# Patient Record
Sex: FEMALE | NOT HISPANIC OR LATINO | Employment: UNEMPLOYED | URBAN - METROPOLITAN AREA
[De-identification: names, ages, dates, MRNs, and addresses within clinical notes are randomized per-mention and may not be internally consistent; named-entity substitution may affect disease eponyms.]

---

## 2019-09-24 ENCOUNTER — OFFICE VISIT (OUTPATIENT)
Dept: RETAIL CLINIC | Facility: CLINIC | Age: 29
End: 2019-09-24

## 2019-09-24 VITALS
OXYGEN SATURATION: 97 % | RESPIRATION RATE: 20 BRPM | DIASTOLIC BLOOD PRESSURE: 76 MMHG | TEMPERATURE: 98.1 F | HEART RATE: 93 BPM | SYSTOLIC BLOOD PRESSURE: 118 MMHG

## 2019-09-24 DIAGNOSIS — J40 BRONCHITIS: Primary | ICD-10-CM

## 2019-09-24 DIAGNOSIS — B35.4 TINEA CORPORIS: ICD-10-CM

## 2019-09-24 PROCEDURE — 99203 OFFICE O/P NEW LOW 30 MIN: CPT | Performed by: NURSE PRACTITIONER

## 2019-09-24 RX ORDER — PREDNISONE 20 MG/1
20 TABLET ORAL 2 TIMES DAILY
Qty: 10 TABLET | Refills: 0 | Status: SHIPPED | OUTPATIENT
Start: 2019-09-24 | End: 2019-09-29

## 2019-09-24 RX ORDER — DOXYCYCLINE HYCLATE 100 MG
100 TABLET ORAL 2 TIMES DAILY
Qty: 14 TABLET | Refills: 0 | Status: SHIPPED | OUTPATIENT
Start: 2019-09-24 | End: 2019-10-01

## 2019-09-24 RX ORDER — KETOCONAZOLE 20 MG/G
CREAM TOPICAL DAILY
Qty: 30 G | Refills: 0 | Status: SHIPPED | OUTPATIENT
Start: 2019-09-24 | End: 2019-10-08

## 2019-09-24 NOTE — PROGRESS NOTES
Mohan Moses is a 29 y.o. female.     Patient reports being seen at  about 1 month ago and diagnosed with URI. She was prescribed unknown medication but didn't start taking it because she started to feel better. However, about a week later her symptoms worsened significantly causing her to return to  where she was diagnosed with PNA. She was given azithromycin and prednisone and achieved complete symptom resolution after using the two meds. 6 days ago she started with cold symptoms. Her  is recovering from presumed viral illness with similar sx as the patient's. She comes into clinic today due to increasing SOA with exertion and wheezing x 2 days. She states this is how she started to feel prior to her  visit where it was found she had PNA.   Pt also mentions an occasionally itchy rash on her right elbow x 1 year. Recently she has been using clotrimazole without improvement.       URI    This is a new problem. Episode onset: 1 week ago. The problem has been gradually worsening. There has been no fever. Associated symptoms include congestion, coughing, headaches, rhinorrhea, a sore throat and wheezing. Pertinent negatives include no chest pain, diarrhea, ear pain, nausea, neck pain, plugged ear sensation, sneezing, swollen glands or vomiting. She has tried nothing for the symptoms.   Rash   This is a chronic problem. Episode onset: 1 year ago. The problem has been waxing and waning since onset. The affected locations include the right arm. The rash is characterized by dryness, itchiness and scaling. It is unknown if there was an exposure to a precipitant. Associated symptoms include congestion, coughing, fatigue, rhinorrhea, shortness of breath and a sore throat. Pertinent negatives include no diarrhea, fever, nail changes or vomiting. Past treatments include moisturizer and antihistamine (clotrimazole BID x 10 days ). The treatment provided no relief. There is no history of allergies or  eczema.       The following portions of the patient's history were reviewed and updated as appropriate: allergies, current medications, past medical history, past social history, past surgical history and problem list.    Review of Systems   Constitutional: Positive for fatigue. Negative for appetite change, chills, diaphoresis and fever.   HENT: Positive for congestion, postnasal drip, rhinorrhea and sore throat. Negative for ear discharge, ear pain, mouth sores, nosebleeds, sinus pressure, sneezing, trouble swallowing and voice change.    Eyes: Negative for redness and itching.   Respiratory: Positive for cough, chest tightness, shortness of breath and wheezing. Negative for stridor.    Cardiovascular: Negative for chest pain.   Gastrointestinal: Negative for diarrhea, nausea and vomiting.   Musculoskeletal: Negative for myalgias and neck pain.   Skin: Negative for nail changes.   Allergic/Immunologic: Negative for environmental allergies and immunocompromised state.   Neurological: Positive for headaches. Negative for dizziness.       Objective   Physical Exam   Constitutional: She is oriented to person, place, and time. She appears well-developed and well-nourished. She is cooperative. She does not appear ill. No distress.   HENT:   Right Ear: Tympanic membrane, external ear and ear canal normal.   Left Ear: Tympanic membrane, external ear and ear canal normal.   Nose: Mucosal edema present. No rhinorrhea. Right sinus exhibits no maxillary sinus tenderness and no frontal sinus tenderness. Left sinus exhibits no maxillary sinus tenderness and no frontal sinus tenderness.   Mouth/Throat: Uvula is midline and mucous membranes are normal. No oral lesions. No uvula swelling. Posterior oropharyngeal erythema present. No oropharyngeal exudate or posterior oropharyngeal edema. Tonsils are 2+ on the right. Tonsils are 2+ on the left. No tonsillar exudate.   Eyes: Conjunctivae and lids are normal.   Cardiovascular: Normal  rate, regular rhythm, S1 normal and S2 normal.   Pulmonary/Chest: Effort normal. She has no decreased breath sounds. She has wheezes in the right upper field, the right middle field and the left lower field. She has rhonchi (clears with deep cough) in the left upper field. She has no rales.   Lymphadenopathy:     She has no cervical adenopathy.   Neurological: She is alert and oriented to person, place, and time.   Skin: Skin is warm and dry. She is not diaphoretic. No pallor.        Vitals reviewed.      Assessment/Plan   Edith was seen today for uri.    Diagnoses and all orders for this visit:    Bronchitis  -     predniSONE (DELTASONE) 20 MG tablet; Take 1 tablet by mouth 2 (Two) Times a Day for 5 days.  -     doxycycline (VIBRAMYICN) 100 MG tablet; Take 1 tablet by mouth 2 (Two) Times a Day for 7 days.    Tinea corporis  -     ketoconazole (NIZORAL) 2 % cream; Apply  topically to the appropriate area as directed Daily for 14 days.          -     Declined nebulizer treatment in clinic today        -     Discussed with pt if medications are not affordable d/t lack of insurance to call clinic and alternative treatment options can be discussed        -     F/U with PCP for persistent symptoms or UC/ER for worsening symptoms

## 2019-09-24 NOTE — PATIENT INSTRUCTIONS
Body Ringworm  Body ringworm is an infection of the skin that often causes a ring-shaped rash. Body ringworm can affect any part of your skin. It can spread easily to others. Body ringworm is also called tinea corporis.  What are the causes?  This condition is caused by funguses called dermatophytes. The condition develops when these funguses grow out of control on the skin.  You can get this condition if you touch a person or animal that has it. You can also get it if you share clothing, bedding, towels, or any other object with an infected person or pet.  What increases the risk?  This condition is more likely to develop in:  · Athletes who often make skin-to-skin contact with other athletes, such as wrestlers.  · People who share equipment and mats.  · People with a weakened immune system.  What are the signs or symptoms?  Symptoms of this condition include:  · Itchy, raised red spots and bumps.  · Red scaly patches.  · A ring-shaped rash. The rash may have:  ? A clear center.  ? Scales or red bumps at its center.  ? Redness near its borders.  ? Dry and scaly skin on or around it.  How is this diagnosed?  This condition can usually be diagnosed with a skin exam. A skin scraping may be taken from the affected area and examined under a microscope to see if the fungus is present.  How is this treated?  This condition may be treated with:  · An antifungal cream or ointment.  · An antifungal shampoo.  · Antifungal medicines. These may be prescribed if your ringworm is severe, keeps coming back, or lasts a long time.  Follow these instructions at home:  · Take over-the-counter and prescription medicines only as told by your health care provider.  · If you were given an antifungal cream or ointment:  ? Use it as told by your health care provider.  ? Wash the infected area and dry it completely before applying the cream or ointment.  · If you were given an antifungal shampoo:  ? Use it as told by your health care  provider.  ? Leave the shampoo on your body for 3-5 minutes before rinsing.  · While you have a rash:  ? Wear loose clothing to stop clothes from rubbing and irritating it.  ? Wash or change your bed sheets every night.  · If your pet has the same infection, take your pet to see a .  How is this prevented?  · Practice good hygiene.  · Wear sandals or shoes in public places and showers.  · Do not share personal items with others.  · Avoid touching red patches of skin on other people.  · Avoid touching pets that have bald spots.  · If you touch an animal that has a bald spot, wash your hands.  Contact a health care provider if:  · Your rash continues to spread after 7 days of treatment.  · Your rash is not gone in 4 weeks.  · The area around your rash gets red, warm, tender, and swollen.  This information is not intended to replace advice given to you by your health care provider. Make sure you discuss any questions you have with your health care provider.  Document Released: 12/15/2001 Document Revised: 05/25/2017 Document Reviewed: 10/13/2016  [employer] Interactive Patient Education © 2019 [employer]      Acute Bronchitis, Adult    Acute bronchitis is sudden (acute) swelling of the air tubes (bronchi) in the lungs. Acute bronchitis causes these tubes to fill with mucus, which can make it hard to breathe. It can also cause coughing or wheezing.  In adults, acute bronchitis usually goes away within 2 weeks. A cough caused by bronchitis may last up to 3 weeks. Smoking, allergies, and asthma can make the condition worse. Repeated episodes of bronchitis may cause further lung problems, such as chronic obstructive pulmonary disease (COPD).  What are the causes?  This condition can be caused by germs and by substances that irritate the lungs, including:  · Cold and flu viruses. This condition is most often caused by the same virus that causes a cold.  · Bacteria.  · Exposure to tobacco smoke, dust, fumes, and  air pollution.  What increases the risk?  This condition is more likely to develop in people who:  · Have close contact with someone with acute bronchitis.  · Are exposed to lung irritants, such as tobacco smoke, dust, fumes, and vapors.  · Have a weak immune system.  · Have a respiratory condition such as asthma.  What are the signs or symptoms?  Symptoms of this condition include:  · A cough.  · Coughing up clear, yellow, or green mucus.  · Wheezing.  · Chest congestion.  · Shortness of breath.  · A fever.  · Body aches.  · Chills.  · A sore throat.  How is this diagnosed?  This condition is usually diagnosed with a physical exam. During the exam, your health care provider may order tests, such as chest X-rays, to rule out other conditions. He or she may also:  · Test a sample of your mucus for bacterial infection.  · Check the level of oxygen in your blood. This is done to check for pneumonia.  · Do a chest X-ray or lung function testing to rule out pneumonia and other conditions.  · Perform blood tests.  Your health care provider will also ask about your symptoms and medical history.  How is this treated?  Most cases of acute bronchitis clear up over time without treatment. Your health care provider may recommend:  · Drinking more fluids. Drinking more makes your mucus thinner, which may make it easier to breathe.  · Taking a medicine for a fever or cough.  · Taking an antibiotic medicine.  · Using an inhaler to help improve shortness of breath and to control a cough.  · Using a cool mist vaporizer or humidifier to make it easier to breathe.  Follow these instructions at home:  Medicines  · Take over-the-counter and prescription medicines only as told by your health care provider.  · If you were prescribed an antibiotic, take it as told by your health care provider. Do not stop taking the antibiotic even if you start to feel better.  General instructions    · Get plenty of rest.  · Drink enough fluids to keep  your urine pale yellow.  · Avoid smoking and secondhand smoke. Exposure to cigarette smoke or irritating chemicals will make bronchitis worse. If you smoke and you need help quitting, ask your health care provider. Quitting smoking will help your lungs heal faster.  · Use an inhaler, cool mist vaporizer, or humidifier as told by your health care provider.  · Keep all follow-up visits as told by your health care provider. This is important.  How is this prevented?  To lower your risk of getting this condition again:  · Wash your hands often with soap and water. If soap and water are not available, use hand .  · Avoid contact with people who have cold symptoms.  · Try not to touch your hands to your mouth, nose, or eyes.  · Make sure to get the flu shot every year.  Contact a health care provider if:  · Your symptoms do not improve in 2 weeks of treatment.  Get help right away if:  · You cough up blood.  · You have chest pain.  · You have severe shortness of breath.  · You become dehydrated.  · You faint or keep feeling like you are going to faint.  · You keep vomiting.  · You have a severe headache.  · Your fever or chills gets worse.  This information is not intended to replace advice given to you by your health care provider. Make sure you discuss any questions you have with your health care provider.  Document Released: 01/25/2006 Document Revised: 08/01/2018 Document Reviewed: 06/07/2017  [employer] Interactive Patient Education © 2019 [employer]

## 2020-06-02 ENCOUNTER — OFFICE VISIT (OUTPATIENT)
Dept: OBSTETRICS AND GYNECOLOGY | Facility: CLINIC | Age: 30
End: 2020-06-02

## 2020-06-02 ENCOUNTER — HOSPITAL ENCOUNTER (OUTPATIENT)
Facility: HOSPITAL | Age: 30
Setting detail: HOSPITAL OUTPATIENT SURGERY
End: 2020-06-02
Attending: OBSTETRICS & GYNECOLOGY | Admitting: OBSTETRICS & GYNECOLOGY

## 2020-06-02 VITALS
DIASTOLIC BLOOD PRESSURE: 60 MMHG | BODY MASS INDEX: 27.84 KG/M2 | WEIGHT: 141.8 LBS | SYSTOLIC BLOOD PRESSURE: 110 MMHG | HEIGHT: 60 IN

## 2020-06-02 DIAGNOSIS — Z30.2 REQUEST FOR STERILIZATION: ICD-10-CM

## 2020-06-02 DIAGNOSIS — Z13.9 SCREENING FOR CONDITION: Primary | ICD-10-CM

## 2020-06-02 LAB
B-HCG UR QL: NEGATIVE
BILIRUB BLD-MCNC: NEGATIVE MG/DL
CLARITY, POC: CLEAR
COLOR UR: YELLOW
GLUCOSE UR STRIP-MCNC: NEGATIVE MG/DL
INTERNAL NEGATIVE CONTROL: NEGATIVE
INTERNAL POSITIVE CONTROL: POSITIVE
KETONES UR QL: ABNORMAL
LEUKOCYTE EST, POC: NEGATIVE
Lab: 55
NITRITE UR-MCNC: NEGATIVE MG/ML
PH UR: 6 [PH] (ref 5–8)
PROT UR STRIP-MCNC: ABNORMAL MG/DL
RBC # UR STRIP: NEGATIVE /UL
SP GR UR: 1.01 (ref 1–1.03)
UROBILINOGEN UR QL: NORMAL

## 2020-06-02 PROCEDURE — 81025 URINE PREGNANCY TEST: CPT | Performed by: OBSTETRICS & GYNECOLOGY

## 2020-06-02 PROCEDURE — 99213 OFFICE O/P EST LOW 20 MIN: CPT | Performed by: OBSTETRICS & GYNECOLOGY

## 2020-06-02 NOTE — PROGRESS NOTES
"EVALUATION AND MANAGEMENT    Patient Care Team:  Provider, No Known as PCP - General  Provider, No Known    Patient new to practice? No  Patient new to examiner? No  Patient referred? No  -----------------------------------------------------HISTORY---------------------------------------------------    Chief Complaint:   Chief Complaint   Patient presents with   • Pre-op Exam     discuss tubal     New problem to examiner? Yes    29 y.o.  No LMP recorded. (Menstrual status: Oral contraceptives).    HPI: History of Present Illness      Pt desires permanent sterilization.  R/b/a/fr explained to pt who verbalized her understanding.      PFSH:   1.    Past Medical History:   Diagnosis Date   • Pneumonia 2019    outpt tx with azithromycin and prednisone      2. History reviewed. No pertinent family history.  3. Social History: :    Employment/occupation:  Yes   Smoker: No   Alcohol: No  Recreational drugs: No    PAST HISTORY REVIEWED:  1.   Past Surgical History:   Procedure Laterality Date   • OVARIAN CYST REMOVAL        2.   Current Outpatient Medications:   •  NON FORMULARY, Unknown OCP, Disp: , Rfl:   3. No Known Allergies    ROS:  Review of Systems   Constitutional: Negative.    HENT: Negative.    Eyes: Negative.    Respiratory: Negative.    Cardiovascular: Negative.    Gastrointestinal: Negative.    Endocrine: Negative.    Musculoskeletal: Negative.    Skin: Negative.    Allergic/Immunologic: Negative.    Neurological: Negative.    Hematological: Negative.    Psychiatric/Behavioral: Negative.        -----------------------------------------------PHYSICAL EXAM----------------------------------------------    Vital Signs: /60   Ht 152.4 cm (60\")   Wt 64.3 kg (141 lb 12.8 oz)   Breastfeeding No   BMI 27.69 kg/m²    Flowsheet Rows      First Filed Value   Admission Height  152.4 cm (60\") Documented at 2020 1403   Admission Weight  64.3 kg (141 lb 12.8 oz) Documented at 2020 " 1403          Physical Exam   Constitutional: She is oriented to person, place, and time. She appears well-developed and well-nourished.   HENT:   Head: Normocephalic and atraumatic.   Eyes: EOM are normal.   Neck: Normal range of motion.   Cardiovascular: Normal rate.   Pulmonary/Chest: Effort normal.   Abdominal: Soft. She exhibits no distension and no mass. There is no tenderness. There is no guarding.   Genitourinary: No vaginal discharge found.   Musculoskeletal: Normal range of motion. She exhibits no edema, tenderness or deformity.   Neurological: She is alert and oriented to person, place, and time.   Skin: Skin is warm and dry. No rash noted. No erythema. No pallor.   Psychiatric: She has a normal mood and affect. Her behavior is normal. Judgment and thought content normal.   Nursing note and vitals reviewed.      -----------------------------------------------MEDICAL DECISION MAKING-----------------------------      DATA Review & labs ordered:     1.   Lab Results (last 24 hours)     Procedure Component Value Units Date/Time    POC Urinalysis Dipstick [[id number]]  (Abnormal) Collected:  06/02/20 1412    Specimen:  Urine Updated:  06/02/20 1413     Color Yellow     Clarity, UA Clear     Glucose, UA Negative mg/dL      Bilirubin Negative     Ketones, UA Trace     Specific Gravity  1.015     Blood, UA Negative     pH, Urine 6.0     Protein, POC Trace mg/dL      Urobilinogen, UA Normal     Leukocytes Negative     Nitrite, UA Negative    POC Pregnancy, Urine [[id number]]  (Normal) Collected:  06/02/20 1411    Specimen:  Urine Updated:  06/02/20 1412     HCG, Urine, QL Negative     Lot Number [id number]     Internal Positive Control Positive     Internal Negative Control Negative        2.   Imaging Results (Last 24 Hours)     ** No results found for the last 24 hours. **        3.   ECG/EMG Results (most recent)     None        4. Old records reviewed? No  5. Old records ordered?  No  6. Labs ordered?: Yes:   urinalysis  7. Imaging other than ultrasound ordered?: No  8. Diagnoses and/or chronic conditions reviewed with pt:       Edith was seen today for pre-op exam.    Diagnoses and all orders for this visit:    Screening for condition  -     POC Urinalysis Dipstick  -     POC Pregnancy, Urine    Request for sterilization  -     Case Request; Standing  -     CBC and Differential; Future  -     Case Request    Other orders  -     Follow Anesthesia Guidelines / Standing Orders; Future  -     Chlorhexidine Skin Prep; Future      9. Risk counseling done:  yes  10. Ultrasound ordered and reviewed?   No      IMPRESSION & DIAGNOSIS:      Patient Active Problem List   Diagnosis   • Request for sterilization       R/b/a/fr explained to pt who verbalized her understanding.     Established problem/s? No   Worsening? No  New Problem/s? Yes   Additional workup planned? No      PLAN:     LAPAROSCOPIC BILATERAL TUBAL LIGATION WITH CAUTERY FOR PERMANENT STERILIZATION.     RTO Return if symptoms worsen or fail to improve.       TIME: More than 50% of time spent in counseling and/or coordination of care.Time spent in counseling 20 min.  Counseling included the following topics permanent sterilization by tubal versus vasectomy with prognosis, differential diagnosis, risks, benefits of treatment, instructions, compliance and/or risk reduction and alternatives.       Benjy Houston MD  14:30  06/02/20

## 2020-06-07 ENCOUNTER — RESULTS ENCOUNTER (OUTPATIENT)
Dept: OBSTETRICS AND GYNECOLOGY | Facility: CLINIC | Age: 30
End: 2020-06-07

## 2020-06-07 DIAGNOSIS — Z30.2 REQUEST FOR STERILIZATION: ICD-10-CM

## 2020-07-01 ENCOUNTER — TRANSCRIBE ORDERS (OUTPATIENT)
Dept: ADMINISTRATIVE | Facility: HOSPITAL | Age: 30
End: 2020-07-01

## 2020-07-01 DIAGNOSIS — Z01.818 OTHER SPECIFIED PRE-OPERATIVE EXAMINATION: Primary | ICD-10-CM

## 2020-07-06 ENCOUNTER — ANESTHESIA EVENT (OUTPATIENT)
Dept: PERIOP | Facility: HOSPITAL | Age: 30
End: 2020-07-06

## 2020-07-07 ENCOUNTER — LAB (OUTPATIENT)
Dept: LAB | Facility: HOSPITAL | Age: 30
End: 2020-07-07

## 2020-07-07 DIAGNOSIS — Z01.818 OTHER SPECIFIED PRE-OPERATIVE EXAMINATION: ICD-10-CM

## 2020-07-09 ENCOUNTER — ANESTHESIA (OUTPATIENT)
Dept: PERIOP | Facility: HOSPITAL | Age: 30
End: 2020-07-09

## 2021-06-09 ENCOUNTER — OFFICE VISIT (OUTPATIENT)
Dept: OBSTETRICS AND GYNECOLOGY | Facility: CLINIC | Age: 31
End: 2021-06-09

## 2021-06-09 VITALS
SYSTOLIC BLOOD PRESSURE: 122 MMHG | DIASTOLIC BLOOD PRESSURE: 60 MMHG | BODY MASS INDEX: 28.47 KG/M2 | WEIGHT: 145 LBS | HEIGHT: 60 IN

## 2021-06-09 DIAGNOSIS — Z30.2 REQUEST FOR STERILIZATION: ICD-10-CM

## 2021-06-09 DIAGNOSIS — Z13.89 SCREENING FOR GENITOURINARY CONDITION: Primary | ICD-10-CM

## 2021-06-09 LAB
B-HCG UR QL: NEGATIVE
INTERNAL NEGATIVE CONTROL: NORMAL
INTERNAL POSITIVE CONTROL: NORMAL
Lab: NORMAL

## 2021-06-09 PROCEDURE — 99214 OFFICE O/P EST MOD 30 MIN: CPT | Performed by: OBSTETRICS & GYNECOLOGY

## 2021-06-09 PROCEDURE — 81025 URINE PREGNANCY TEST: CPT | Performed by: OBSTETRICS & GYNECOLOGY

## 2021-06-09 NOTE — PROGRESS NOTES
"EVALUATION AND MANAGEMENT ENCOUNTER    Edith Moses  Patient new to examiner? No  New problem to examiner? Yes  Patient referred? No    -----------------------------------------------------HISTORY---------------------------------------------------    Chief Complaint:   Chief Complaint   Patient presents with   • Pre-op Exam     Discuss BTL       HPI:  Edith Moses is a 30 y.o.  with Patient's last menstrual period was 2021. here to discuss and schedule permanent sterilization.    Pt denies:  Any problems.    History of Present Illness     Edith Moses  reports that she has never smoked. She has never used smokeless tobacco.. t.           ROS:  Review of Systems   Constitutional: Negative.    HENT: Negative.    Eyes: Negative.    Respiratory: Negative.    Cardiovascular: Negative.    Gastrointestinal: Negative.    Endocrine: Negative.    Musculoskeletal: Negative.    Skin: Negative.    Allergic/Immunologic: Negative.    Neurological: Negative.    Hematological: Negative.    Psychiatric/Behavioral: Negative.    :    Patient reports that she is not currently experiencing any symptoms of urinary incontinence.      noTESTED FOR CHLAMYDIA?  -----------------------------------------------PHYSICAL EXAM----------------------------------------------    Vital Signs: /60   Ht 152.4 cm (60\")   Wt 65.8 kg (145 lb)   LMP 2021   Breastfeeding No   BMI 28.32 kg/m²    Flowsheet Rows      First Filed Value   Admission Height  152.4 cm (60\") Documented at 2021 1003   Admission Weight  65.8 kg (145 lb) Documented at 2021 1003          Physical Exam  Vitals and nursing note reviewed.   Constitutional:       Appearance: She is well-developed.   HENT:      Head: Normocephalic and atraumatic.   Cardiovascular:      Rate and Rhythm: Normal rate.   Pulmonary:      Effort: Pulmonary effort is normal.   Abdominal:      General: There is no distension.      Palpations: Abdomen is soft. There is no " mass.      Tenderness: There is no abdominal tenderness. There is no guarding.   Genitourinary:     Vagina: No vaginal discharge.   Musculoskeletal:         General: No tenderness or deformity. Normal range of motion.      Cervical back: Normal range of motion.   Skin:     General: Skin is warm and dry.      Coloration: Skin is not pale.      Findings: No erythema or rash.   Neurological:      Mental Status: She is alert and oriented to person, place, and time.   Psychiatric:         Behavior: Behavior normal.         Thought Content: Thought content normal.         Judgment: Judgment normal.         -----------------------------------------------MEDICAL DECISION MAKING-----------------------------        DATA Review & labs ordered:     1.   Lab Results (last 24 hours)     Procedure Component Value Units Date/Time    POC Pregnancy, Urine [[id number]] Collected: 06/09/21 1004    Specimen: Urine Updated: 06/09/21 1004     HCG, Urine, QL Negative     Lot Number [id number]     Internal Positive Control Passed     Internal Negative Control Passed        2.   Imaging Results (Last 24 Hours)     ** No results found for the last 24 hours. **        3.   ECG/EMG Results (most recent)     None        4. Old records reviewed? Yes  5. Old records ordered?  No  6. Labs ordered?: Yes:  urinalysis: clr  7. Imaging other than ultrasound ordered?: No        Reviewed with other physician? no     8. Ultrasound ordered and reviewed? No  9. Diagnoses and/or chronic conditions reviewed:      Diagnoses and all orders for this visit:    1. Screening for genitourinary condition (Primary)  -     POC Pregnancy, Urine    2. Request for sterilization  -     Case Request; Standing  -     CBC and Differential; Future  -     Case Request    Other orders  -     Follow Anesthesia Guidelines / Standing Orders; Future  -     Chlorhexidine Skin Prep; Future  -     Follow Anesthesia Guidelines / Standing Orders; Standing  -     Obtain informed consent;  Standing  -     Place sequential compression device; Standing  -     Verify / Perform Chlorhexidine Skin Prep; Standing        IMPRESSION/PROBLEM:      Indicated sterilization    (Established problem/s? No, worsening? No)    (New Problem/s? Yes, additional workup planned? No)      PLAN:     1. LAPAROSCOPIC BILATERAL SALPINGECTOMY FOR PERMANENT STERILIZATION.    2. RISKS, ALTERNATIVES, COMPLICATIONS OF THE PROCEDURE INCLUDING BUT NOT LIMITED TO:    INTRAOPERATIVE RISKS: INJURY TO INTERNAL AND ADJACENT ORGANS AND STRUCTURES (BOWEL, BLADDER, URETER,BLOOD VESSELS) OR HEMORRHAGE REQUIRING FURTHER SURGERY (LAPAROTOMY),  POSSIBLE NON-DIAGNOSTIC FINDINGS, DISCOVERY OF POSSIBLE MALIGNANCY, INFECTION, AND DEATH;   POSTOP COMPLICATIONS: BLEEDING, INFECTION (REQUIRING POSSIBLE REOPERATION), FAILURE OF GOAL OF SURGERY AND RECURRENCE OF ORIGINAL SYMPTOMS, PNEUMONIA, PULMONARY EMBOLISM, AND DEATH;  WERE EXPLAINED TO THE PT WHO VERBALIZED HER UNDERSTANDING.            Pt to call for any results from testing promptly if she does not hear from us.     RTO Return if symptoms worsen or fail to improve, for postop check..  Instructions and precautions given.     I spent 30 minutes caring for Edith on this date of service. This time includes time spent by me in the following activities: preparing for the visit, reviewing tests, obtaining and/or reviewing a separately obtained history, counseling and educating the patient/family/caregiver, ordering medications, tests, or procedures, documenting information in the medical record, independently interpreting results and communicating that information with the patient/family/caregiver and care coordination.       Benjy Houston MD  10:36 EDT  06/09/21

## 2021-08-05 ENCOUNTER — TRANSCRIBE ORDERS (OUTPATIENT)
Dept: ADMINISTRATIVE | Facility: HOSPITAL | Age: 31
End: 2021-08-05

## 2021-08-05 DIAGNOSIS — U07.1 COVID-19: Primary | ICD-10-CM

## 2021-08-11 ENCOUNTER — PRE-ADMISSION TESTING (OUTPATIENT)
Dept: PREADMISSION TESTING | Facility: HOSPITAL | Age: 31
End: 2021-08-11

## 2021-08-11 VITALS
HEIGHT: 60 IN | WEIGHT: 143.1 LBS | OXYGEN SATURATION: 99 % | RESPIRATION RATE: 16 BRPM | BODY MASS INDEX: 28.09 KG/M2 | SYSTOLIC BLOOD PRESSURE: 119 MMHG | DIASTOLIC BLOOD PRESSURE: 77 MMHG | HEART RATE: 76 BPM

## 2021-08-11 PROCEDURE — 85025 COMPLETE CBC W/AUTO DIFF WBC: CPT | Performed by: OBSTETRICS & GYNECOLOGY

## 2021-08-11 RX ORDER — MULTIPLE VITAMINS W/ MINERALS TAB 9MG-400MCG
1 TAB ORAL DAILY
COMMUNITY

## 2021-08-11 RX ORDER — OMEPRAZOLE 20 MG/1
20 CAPSULE, DELAYED RELEASE ORAL DAILY PRN
COMMUNITY

## 2021-08-11 RX ORDER — NORETHINDRONE ACETATE AND ETHINYL ESTRADIOL 1; 5 MG/1; UG/1
1 TABLET ORAL DAILY
COMMUNITY
End: 2021-08-19 | Stop reason: HOSPADM

## 2021-08-11 RX ORDER — ASCORBIC ACID 500 MG
500 TABLET ORAL DAILY
COMMUNITY

## 2021-08-11 NOTE — DISCHARGE INSTRUCTIONS
TO STOP CLEARS/GATORADE @ 4:30 AM THE DAY OF SURGERY    TO HOLD VITAMIN C, HAIR, SKIN & NAIL VITAMIN FOR 1 WEEK PRIOR TO SURGERY    PRE-ADMISSION TESTING INSTRUCTIONS FOR ADULTS    Take these medications the morning of surgery with a small sip of water: OMEPRAZOLE      No aspirin, advil, aleve, ibuprofen, naproxen, diet pills, decongestants, or herbal/vitamins for a week prior to surgery.    General Instructions:    • Do not eat solid food after midnight the night before surgery.  No gum, mints, or hard candy after midnight the night before surgery.  • You may drink clear liquids the day of surgery up until 2 hours before your arrival time.  • Clear liquids are liquids you can see through. Nothing RED in color.    Plain water    Sports drinks  Sodas     Gelatin (Jell-O)  Fruit juices without pulp such as white grape juice and apple juice  Popsicles that contain no fruit or yogurt  Tea or coffee (no cream or milk added)    • It is beneficial for you to have a clear drink that contains carbohydrates just before you leave your house and before your fasting time begins.  We suggest a 20 ounce bottle of Gatorade or Powerade for non-diabetic patients or a 20 ounce bottle of G2 or Powerade Zero for diabetic patients.     • Patients who avoid smoking, chewing tobacco and alcohol for 4 weeks prior to surgery have a reduced risk of post-operative complications.  If at all possible, quit smoking as many days before surgery as you can.    • Do not smoke, use chewing tobacco or drink alcohol the day of surgery    • Bring your C-PAP/ BI-PAP machine if you use one.  • Wear clean comfortable clothes and socks.  • Do not wear contact lenses, lotion, deodorant, or make-up.  Bring a case for your glasses if applicable. You may brush your teeth the morning of surgery.  • You may wear dentures/partials, do not put adhesive/glue on them.  • Bring crutches or walker if applicable.  • Leave all other jewelry and valuables at  home.      Preventing a Surgical Site Infection:    • Shower the night before and on the morning of surgery using the chlorhexidine soap you were given.  Use a clean washcloth with the soap.  Place clean sheets on your bed after showering the night before surgery. Do not use the CHG soap on your hair, face, or private areas. Wash your body gently for five (5) minutes. Do not scrub your skin.  Dry with a clean towel and dress in clean clothing.    • Do not shave the surgical area for 10 days-2 weeks prior to surgery  because the razor can irritate skin and make it easier to develop an infection.  • Make sure you, your family, and all healthcare providers clean their hands with soap and water or an alcohol based hand  before caring for you or your wound.      Day of surgery:    Your surgeon’s office will advise you of your arrival time for the day of surgery.    Upon arrival, a Pre-op nurse and Anesthesia provider will review your health history, obtain vital signs, and answer questions you may have.  The only belongings needed at this time will be your home medications and if applicable your C-PAP/BI-PAP machine.  If you are staying overnight your family can leave the rest of your belongings in the car and bring them to your room later.  A Pre-op nurse will start an IV and you may receive medication in preparation for surgery, including something to help you relax.  Your family will be able to see you in the Pre-op area.  While you are in surgery your family should notify the waiting room  if they leave the waiting room area and provide a contact phone number.    IF you have any questions, you can call the Pre-Admission Department at [phone] or your surgeon's office.  Notify your surgeon if  you become sick, have a fever, productive cough, or cannot be here the day of surgery    Please be aware that surgery does come with discomfort.  We want to make every effort to control your discomfort  so please discuss any uncontrolled symptoms with your nurse.   Your doctor will most likely have prescribed pain medications.      If you are going home after surgery, you will receive individualized written care instructions before being discharged.  A responsible adult (over the age of 18) must drive you to and from the hospital on the day of your surgery and stay with you for 24 hours after anesthesia.    If you are staying overnight following surgery, you will be transported to your hospital room following the recovery period.  [facility] has all private rooms.    You may receive a survey regarding the care you received. Your feedback is very important and will be used to collect the necessary data to help us to continue to provide excellent care.     Deductibles and co-payments are collected on the day of service. Please be prepared to pay the required co-pay, deductible or deposit on the day of service as defined by your plan.

## 2021-08-11 NOTE — PAT
Patient here for PAT visit, lab completed, pre-op instructions reviewed, verbalizes understanding, scheduled for covid test on 08/17.

## 2021-08-17 ENCOUNTER — LAB (OUTPATIENT)
Dept: LAB | Facility: HOSPITAL | Age: 31
End: 2021-08-17

## 2021-08-17 DIAGNOSIS — U07.1 COVID-19: ICD-10-CM

## 2021-08-17 LAB — SARS-COV-2 RNA PNL SPEC NAA+PROBE: NOT DETECTED

## 2021-08-17 PROCEDURE — C9803 HOPD COVID-19 SPEC COLLECT: HCPCS

## 2021-08-17 PROCEDURE — 87635 SARS-COV-2 COVID-19 AMP PRB: CPT | Performed by: OBSTETRICS & GYNECOLOGY

## 2021-08-18 ENCOUNTER — ANESTHESIA EVENT (OUTPATIENT)
Dept: PERIOP | Facility: HOSPITAL | Age: 31
End: 2021-08-18

## 2021-08-19 ENCOUNTER — ANESTHESIA (OUTPATIENT)
Dept: PERIOP | Facility: HOSPITAL | Age: 31
End: 2021-08-19

## 2021-08-19 ENCOUNTER — HOSPITAL ENCOUNTER (OUTPATIENT)
Facility: HOSPITAL | Age: 31
Setting detail: HOSPITAL OUTPATIENT SURGERY
Discharge: HOME | End: 2021-08-19
Attending: OBSTETRICS & GYNECOLOGY | Admitting: OBSTETRICS & GYNECOLOGY

## 2021-08-19 VITALS
BODY MASS INDEX: 27.81 KG/M2 | SYSTOLIC BLOOD PRESSURE: 105 MMHG | HEART RATE: 74 BPM | TEMPERATURE: 98.4 F | OXYGEN SATURATION: 97 % | DIASTOLIC BLOOD PRESSURE: 67 MMHG | RESPIRATION RATE: 13 BRPM | WEIGHT: 142.4 LBS

## 2021-08-19 DIAGNOSIS — Z90.79 STATUS POST BILATERAL SALPINGECTOMY: Primary | ICD-10-CM

## 2021-08-19 DIAGNOSIS — Z30.2 REQUEST FOR STERILIZATION: ICD-10-CM

## 2021-08-19 LAB — HCG SERPL QL: NEGATIVE

## 2021-08-19 PROCEDURE — 25010000002 NEOSTIGMINE 10 MG/10ML SOLUTION: Performed by: NURSE ANESTHETIST, CERTIFIED REGISTERED

## 2021-08-19 PROCEDURE — 84703 CHORIONIC GONADOTROPIN ASSAY: CPT | Performed by: NURSE ANESTHETIST, CERTIFIED REGISTERED

## 2021-08-19 PROCEDURE — 25010000002 HYDROMORPHONE PER 4 MG: Performed by: NURSE ANESTHETIST, CERTIFIED REGISTERED

## 2021-08-19 PROCEDURE — 25010000002 ONDANSETRON PER 1 MG: Performed by: NURSE ANESTHETIST, CERTIFIED REGISTERED

## 2021-08-19 PROCEDURE — 58661 LAPAROSCOPY REMOVE ADNEXA: CPT | Performed by: SPECIALIST/TECHNOLOGIST, OTHER

## 2021-08-19 PROCEDURE — 25010000002 MIDAZOLAM PER 1MG: Performed by: NURSE ANESTHETIST, CERTIFIED REGISTERED

## 2021-08-19 PROCEDURE — 58661 LAPAROSCOPY REMOVE ADNEXA: CPT | Performed by: OBSTETRICS & GYNECOLOGY

## 2021-08-19 PROCEDURE — S0260 H&P FOR SURGERY: HCPCS | Performed by: OBSTETRICS & GYNECOLOGY

## 2021-08-19 PROCEDURE — 25010000002 PROPOFOL 10 MG/ML EMULSION: Performed by: NURSE ANESTHETIST, CERTIFIED REGISTERED

## 2021-08-19 PROCEDURE — 25010000002 FENTANYL CITRATE (PF) 50 MCG/ML SOLUTION: Performed by: NURSE ANESTHETIST, CERTIFIED REGISTERED

## 2021-08-19 PROCEDURE — 25010000002 SUCCINYLCHOLINE PER 20 MG: Performed by: NURSE ANESTHETIST, CERTIFIED REGISTERED

## 2021-08-19 PROCEDURE — 25010000002 DEXAMETHASONE PER 1 MG: Performed by: NURSE ANESTHETIST, CERTIFIED REGISTERED

## 2021-08-19 PROCEDURE — 88302 TISSUE EXAM BY PATHOLOGIST: CPT | Performed by: OBSTETRICS & GYNECOLOGY

## 2021-08-19 RX ORDER — MAGNESIUM HYDROXIDE 1200 MG/15ML
LIQUID ORAL AS NEEDED
Status: DISCONTINUED | OUTPATIENT
Start: 2021-08-19 | End: 2021-08-19 | Stop reason: HOSPADM

## 2021-08-19 RX ORDER — FENTANYL CITRATE 50 UG/ML
50 INJECTION, SOLUTION INTRAMUSCULAR; INTRAVENOUS
Status: DISCONTINUED | OUTPATIENT
Start: 2021-08-19 | End: 2021-08-19 | Stop reason: HOSPADM

## 2021-08-19 RX ORDER — FENTANYL CITRATE 50 UG/ML
INJECTION, SOLUTION INTRAMUSCULAR; INTRAVENOUS AS NEEDED
Status: DISCONTINUED | OUTPATIENT
Start: 2021-08-19 | End: 2021-08-19 | Stop reason: SURG

## 2021-08-19 RX ORDER — LIDOCAINE HYDROCHLORIDE 10 MG/ML
0.5 INJECTION, SOLUTION EPIDURAL; INFILTRATION; INTRACAUDAL; PERINEURAL ONCE AS NEEDED
Status: DISCONTINUED | OUTPATIENT
Start: 2021-08-19 | End: 2021-08-19 | Stop reason: HOSPADM

## 2021-08-19 RX ORDER — HYDROCODONE BITARTRATE AND ACETAMINOPHEN 5; 325 MG/1; MG/1
1 TABLET ORAL ONCE AS NEEDED
Status: DISCONTINUED | OUTPATIENT
Start: 2021-08-19 | End: 2021-08-19 | Stop reason: HOSPADM

## 2021-08-19 RX ORDER — IBUPROFEN 800 MG/1
800 TABLET ORAL EVERY 8 HOURS PRN
Qty: 30 TABLET | Refills: 0 | Status: SHIPPED | OUTPATIENT
Start: 2021-08-19

## 2021-08-19 RX ORDER — SODIUM CHLORIDE, SODIUM LACTATE, POTASSIUM CHLORIDE, CALCIUM CHLORIDE 600; 310; 30; 20 MG/100ML; MG/100ML; MG/100ML; MG/100ML
9 INJECTION, SOLUTION INTRAVENOUS CONTINUOUS
Status: DISCONTINUED | OUTPATIENT
Start: 2021-08-19 | End: 2021-08-19 | Stop reason: HOSPADM

## 2021-08-19 RX ORDER — MIDAZOLAM HYDROCHLORIDE 2 MG/2ML
1 INJECTION, SOLUTION INTRAMUSCULAR; INTRAVENOUS
Status: DISCONTINUED | OUTPATIENT
Start: 2021-08-19 | End: 2021-08-19 | Stop reason: HOSPADM

## 2021-08-19 RX ORDER — PROPOFOL 10 MG/ML
VIAL (ML) INTRAVENOUS AS NEEDED
Status: DISCONTINUED | OUTPATIENT
Start: 2021-08-19 | End: 2021-08-19 | Stop reason: SURG

## 2021-08-19 RX ORDER — HYDROCODONE BITARTRATE AND ACETAMINOPHEN 7.5; 325 MG/1; MG/1
1 TABLET ORAL EVERY 4 HOURS PRN
Status: DISCONTINUED | OUTPATIENT
Start: 2021-08-19 | End: 2021-08-19 | Stop reason: HOSPADM

## 2021-08-19 RX ORDER — NEOSTIGMINE METHYLSULFATE 1 MG/ML
INJECTION, SOLUTION INTRAVENOUS AS NEEDED
Status: DISCONTINUED | OUTPATIENT
Start: 2021-08-19 | End: 2021-08-19 | Stop reason: SURG

## 2021-08-19 RX ORDER — SODIUM CHLORIDE 9 MG/ML
40 INJECTION, SOLUTION INTRAVENOUS AS NEEDED
Status: DISCONTINUED | OUTPATIENT
Start: 2021-08-19 | End: 2021-08-19 | Stop reason: HOSPADM

## 2021-08-19 RX ORDER — SUCCINYLCHOLINE CHLORIDE 20 MG/ML
INJECTION INTRAMUSCULAR; INTRAVENOUS AS NEEDED
Status: DISCONTINUED | OUTPATIENT
Start: 2021-08-19 | End: 2021-08-19 | Stop reason: SURG

## 2021-08-19 RX ORDER — SODIUM CHLORIDE 0.9 % (FLUSH) 0.9 %
10 SYRINGE (ML) INJECTION EVERY 12 HOURS SCHEDULED
Status: DISCONTINUED | OUTPATIENT
Start: 2021-08-19 | End: 2021-08-19 | Stop reason: HOSPADM

## 2021-08-19 RX ORDER — SODIUM CHLORIDE, SODIUM LACTATE, POTASSIUM CHLORIDE, CALCIUM CHLORIDE 600; 310; 30; 20 MG/100ML; MG/100ML; MG/100ML; MG/100ML
100 INJECTION, SOLUTION INTRAVENOUS CONTINUOUS
Status: DISCONTINUED | OUTPATIENT
Start: 2021-08-19 | End: 2021-08-19 | Stop reason: HOSPADM

## 2021-08-19 RX ORDER — DEXAMETHASONE SODIUM PHOSPHATE 4 MG/ML
4 INJECTION, SOLUTION INTRA-ARTICULAR; INTRALESIONAL; INTRAMUSCULAR; INTRAVENOUS; SOFT TISSUE ONCE AS NEEDED
Status: COMPLETED | OUTPATIENT
Start: 2021-08-19 | End: 2021-08-19

## 2021-08-19 RX ORDER — ONDANSETRON 2 MG/ML
4 INJECTION INTRAMUSCULAR; INTRAVENOUS ONCE AS NEEDED
Status: DISCONTINUED | OUTPATIENT
Start: 2021-08-19 | End: 2021-08-19 | Stop reason: HOSPADM

## 2021-08-19 RX ORDER — EPHEDRINE SULFATE 50 MG/ML
INJECTION, SOLUTION INTRAVENOUS AS NEEDED
Status: DISCONTINUED | OUTPATIENT
Start: 2021-08-19 | End: 2021-08-19 | Stop reason: SURG

## 2021-08-19 RX ORDER — LIDOCAINE HYDROCHLORIDE 20 MG/ML
INJECTION, SOLUTION INFILTRATION; PERINEURAL AS NEEDED
Status: DISCONTINUED | OUTPATIENT
Start: 2021-08-19 | End: 2021-08-19 | Stop reason: SURG

## 2021-08-19 RX ORDER — KETAMINE HYDROCHLORIDE 10 MG/ML
INJECTION INTRAMUSCULAR; INTRAVENOUS AS NEEDED
Status: DISCONTINUED | OUTPATIENT
Start: 2021-08-19 | End: 2021-08-19 | Stop reason: SURG

## 2021-08-19 RX ORDER — HYDROCODONE BITARTRATE AND ACETAMINOPHEN 5; 325 MG/1; MG/1
2 TABLET ORAL EVERY 6 HOURS PRN
Qty: 20 TABLET | Refills: 0 | Status: SHIPPED | OUTPATIENT
Start: 2021-08-19

## 2021-08-19 RX ORDER — HYDROMORPHONE HYDROCHLORIDE 1 MG/ML
0.25 INJECTION, SOLUTION INTRAMUSCULAR; INTRAVENOUS; SUBCUTANEOUS
Status: DISCONTINUED | OUTPATIENT
Start: 2021-08-19 | End: 2021-08-19 | Stop reason: HOSPADM

## 2021-08-19 RX ORDER — SCOLOPAMINE TRANSDERMAL SYSTEM 1 MG/1
1 PATCH, EXTENDED RELEASE TRANSDERMAL CONTINUOUS
Status: DISCONTINUED | OUTPATIENT
Start: 2021-08-19 | End: 2021-08-19 | Stop reason: HOSPADM

## 2021-08-19 RX ORDER — GLYCOPYRROLATE 0.2 MG/ML
INJECTION INTRAMUSCULAR; INTRAVENOUS AS NEEDED
Status: DISCONTINUED | OUTPATIENT
Start: 2021-08-19 | End: 2021-08-19 | Stop reason: SURG

## 2021-08-19 RX ORDER — HYDROMORPHONE HCL 110MG/55ML
PATIENT CONTROLLED ANALGESIA SYRINGE INTRAVENOUS AS NEEDED
Status: DISCONTINUED | OUTPATIENT
Start: 2021-08-19 | End: 2021-08-19 | Stop reason: SURG

## 2021-08-19 RX ORDER — ROCURONIUM BROMIDE 10 MG/ML
INJECTION, SOLUTION INTRAVENOUS AS NEEDED
Status: DISCONTINUED | OUTPATIENT
Start: 2021-08-19 | End: 2021-08-19 | Stop reason: SURG

## 2021-08-19 RX ORDER — ONDANSETRON 2 MG/ML
4 INJECTION INTRAMUSCULAR; INTRAVENOUS ONCE AS NEEDED
Status: COMPLETED | OUTPATIENT
Start: 2021-08-19 | End: 2021-08-19

## 2021-08-19 RX ORDER — SODIUM CHLORIDE 0.9 % (FLUSH) 0.9 %
10 SYRINGE (ML) INJECTION AS NEEDED
Status: DISCONTINUED | OUTPATIENT
Start: 2021-08-19 | End: 2021-08-19 | Stop reason: HOSPADM

## 2021-08-19 RX ORDER — DEXMEDETOMIDINE HYDROCHLORIDE 100 UG/ML
INJECTION, SOLUTION INTRAVENOUS AS NEEDED
Status: DISCONTINUED | OUTPATIENT
Start: 2021-08-19 | End: 2021-08-19 | Stop reason: SURG

## 2021-08-19 RX ADMIN — PROPOFOL 140 MG: 10 INJECTION, EMULSION INTRAVENOUS at 07:56

## 2021-08-19 RX ADMIN — DEXMEDETOMIDINE 4 MCG: 100 INJECTION, SOLUTION, CONCENTRATE INTRAVENOUS at 08:26

## 2021-08-19 RX ADMIN — KETAMINE HYDROCHLORIDE 10 MG: 10 INJECTION, SOLUTION INTRAMUSCULAR; INTRAVENOUS at 07:55

## 2021-08-19 RX ADMIN — NEOSTIGMINE METHYLSULFATE 3 MG: 1 INJECTION INTRAVENOUS at 08:33

## 2021-08-19 RX ADMIN — FENTANYL CITRATE 50 MCG: 50 INJECTION INTRAMUSCULAR; INTRAVENOUS at 08:03

## 2021-08-19 RX ADMIN — ROCURONIUM BROMIDE 25 MG: 10 INJECTION INTRAVENOUS at 08:04

## 2021-08-19 RX ADMIN — EPHEDRINE SULFATE 5 MG: 50 INJECTION, SOLUTION INTRAVENOUS at 07:53

## 2021-08-19 RX ADMIN — HYDROMORPHONE HYDROCHLORIDE 0.2 MG: 2 INJECTION, SOLUTION INTRAMUSCULAR; INTRAVENOUS; SUBCUTANEOUS at 08:33

## 2021-08-19 RX ADMIN — SCOPALAMINE 1 PATCH: 1 PATCH, EXTENDED RELEASE TRANSDERMAL at 07:46

## 2021-08-19 RX ADMIN — HYDROMORPHONE HYDROCHLORIDE 0.2 MG: 2 INJECTION, SOLUTION INTRAMUSCULAR; INTRAVENOUS; SUBCUTANEOUS at 08:19

## 2021-08-19 RX ADMIN — MIDAZOLAM HYDROCHLORIDE 1 MG: 1 INJECTION, SOLUTION INTRAMUSCULAR; INTRAVENOUS at 07:46

## 2021-08-19 RX ADMIN — FENTANYL CITRATE 50 MCG: 50 INJECTION INTRAMUSCULAR; INTRAVENOUS at 07:54

## 2021-08-19 RX ADMIN — DEXAMETHASONE SODIUM PHOSPHATE 4 MG: 4 INJECTION, SOLUTION INTRAMUSCULAR; INTRAVENOUS at 07:46

## 2021-08-19 RX ADMIN — LIDOCAINE HYDROCHLORIDE 60 MG: 20 INJECTION, SOLUTION INFILTRATION; PERINEURAL at 07:55

## 2021-08-19 RX ADMIN — KETAMINE HYDROCHLORIDE 15 MG: 10 INJECTION, SOLUTION INTRAMUSCULAR; INTRAVENOUS at 08:03

## 2021-08-19 RX ADMIN — HYDROMORPHONE HYDROCHLORIDE 0.25 MG: 1 INJECTION, SOLUTION INTRAMUSCULAR; INTRAVENOUS; SUBCUTANEOUS at 09:12

## 2021-08-19 RX ADMIN — GLYCOPYRROLATE 0.3 MG: 0.2 INJECTION INTRAMUSCULAR; INTRAVENOUS at 08:33

## 2021-08-19 RX ADMIN — HYDROMORPHONE HYDROCHLORIDE 0.2 MG: 2 INJECTION, SOLUTION INTRAMUSCULAR; INTRAVENOUS; SUBCUTANEOUS at 08:29

## 2021-08-19 RX ADMIN — SODIUM CHLORIDE, POTASSIUM CHLORIDE, SODIUM LACTATE AND CALCIUM CHLORIDE: 600; 310; 30; 20 INJECTION, SOLUTION INTRAVENOUS at 07:49

## 2021-08-19 RX ADMIN — ROCURONIUM BROMIDE 5 MG: 10 INJECTION INTRAVENOUS at 07:55

## 2021-08-19 RX ADMIN — SUCCINYLCHOLINE CHLORIDE 100 MG: 20 INJECTION, SOLUTION INTRAMUSCULAR; INTRAVENOUS at 07:57

## 2021-08-19 RX ADMIN — ONDANSETRON 4 MG: 2 INJECTION INTRAMUSCULAR; INTRAVENOUS at 07:46

## 2021-08-19 NOTE — H&P
PREOPERATIVE HISTORY AND PHYSICAL      Patient Care Team:  Dennis Lr MD as PCP - General (Family Medicine)  Provider, No Known    Chief complaint: Request for sterilization    Pt is a 31 y.o.   Patient's last menstrual period was 2021 (exact date).     HPI:Pt is a 31 y.o. here for permanent sterilization.  Pt desires bilateral salpingectomy.  R/b/a/fr explained to pt.       PMHx:   Past Medical History:   Diagnosis Date   • Asthma     AS A CHILD, NO PROBLEMS NOW   • GERD (gastroesophageal reflux disease)    • Pneumonia 2019    outpt tx with azithromycin and prednisone    • Request for sterilization     SCHEDULED FOR LAP TUBAL       Current problem list:  Patient Active Problem List   Diagnosis   • Request for sterilization       PSHx:   Past Surgical History:   Procedure Laterality Date   • OVARIAN CYST REMOVAL         Social Hx:   Social History     Socioeconomic History   • Marital status:      Spouse name: Not on file   • Number of children: Not on file   • Years of education: Not on file   • Highest education level: Not on file   Tobacco Use   • Smoking status: Never Smoker   • Smokeless tobacco: Never Used   Vaping Use   • Vaping Use: Never assessed   Substance and Sexual Activity   • Alcohol use: Yes     Comment: rare   • Drug use: No   • Sexual activity: Defer     Partners: Male     Birth control/protection: OCP       FHx:   Family History   Problem Relation Age of Onset   • Heart disease Mother    • No Known Problems Father    • No Known Problems Sister    • No Known Problems Brother        Debilities/Disabilities Identified: None    Emotional Behavior: Appropriate    PGyn Hx:  otherwise noncontributory    POBHx:   OB History    Para Term  AB Living   4 3 2 0 1 1   SAB TAB Ectopic Molar Multiple Live Births   1 0 0 0 0 1      # Outcome Date GA Lbr Fransico/2nd Weight Sex Delivery Anes PTL Lv   4 Para 16   3345 g (7 lb 6 oz) M Vag-Spont None N ANGELES      Birth  Comments: abdomen 12.5 inches, chest 13.5 inches       Complications: Shoulder Dystocia      Apgar1: 4  Apgar5: 9   3 SAB            2 Term            1 Term                Allergies: Patient has no known allergies.    Medications:   Medications Prior to Admission   Medication Sig Dispense Refill Last Dose   • norethindrone-ethinyl estradiol (FEMHRT 1/5) 1-5 MG-MCG tablet Take 1 tablet by mouth Daily.   8/18/2021 at Unknown time   • omeprazole (priLOSEC) 20 MG capsule Take 20 mg by mouth Daily As Needed.   8/19/2021 at Unknown time   • ascorbic acid (VITAMIN C) 500 MG tablet Take 500 mg by mouth Daily.   8/12/2021   • multivitamin with minerals (HAIR SKIN AND NAILS FORMULA PO) Take 1 tablet by mouth Daily.   8/12/2021                              Current Facility-Administered Medications:   •  dexamethasone (DECADRON) injection 4 mg, 4 mg, Intravenous, Once PRN, Jailyn Alvarenga CRNA  •  lactated ringers infusion, 9 mL/hr, Intravenous, Continuous, You Thompson CRNA  •  lidocaine PF 1% (XYLOCAINE) injection 0.5 mL, 0.5 mL, Injection, Once PRN, You Thompson CRNA  •  Midazolam HCl (PF) (VERSED) injection 1 mg, 1 mg, Intravenous, Q10 Min PRN, Jailyn Alvarenga CRNA  •  ondansetron (ZOFRAN) injection 4 mg, 4 mg, Intravenous, Once PRN, Jailyn Alvarenga CRNA  •  scopolamine patch 1 mg/72 hr, 1 patch, Transdermal, Continuous, Jailyn Alvarenga CRNA  •  sodium chloride 0.9 % flush 10 mL, 10 mL, Intravenous, PRN, You Thompson CRNA  •  sodium chloride 0.9 % flush 10 mL, 10 mL, Intravenous, Q12H, You Thompson CRNA  •  sodium chloride 0.9 % infusion 40 mL, 40 mL, Intravenous, PRN, You Thompson CRNA        Review of Systems   Constitutional: Negative.    HENT: Negative.    Eyes: Negative.    Respiratory: Negative.    Cardiovascular: Negative.    Gastrointestinal: Negative.    Endocrine: Negative.    Musculoskeletal: Negative.    Skin: Negative.    Allergic/Immunologic:  Negative.    Neurological: Negative.    Hematological: Negative.    Psychiatric/Behavioral: Negative.        Vital Signs  /71 (BP Location: Right arm, Patient Position: Sitting)   Pulse 82   Temp 98.5 °F (36.9 °C) (Oral)   Resp 16   Wt 64.6 kg (142 lb 6.4 oz)   LMP 08/07/2021 (Exact Date)   SpO2 99%   BMI 27.81 kg/m²     Physical Exam  Vitals and nursing note reviewed.   Constitutional:       Appearance: She is well-developed.   HENT:      Head: Normocephalic and atraumatic.   Cardiovascular:      Rate and Rhythm: Normal rate.   Pulmonary:      Effort: Pulmonary effort is normal.   Abdominal:      General: There is no distension.      Palpations: Abdomen is soft. There is no mass.      Tenderness: There is no abdominal tenderness. There is no guarding.   Genitourinary:     Vagina: No vaginal discharge.   Musculoskeletal:         General: No tenderness or deformity. Normal range of motion.      Cervical back: Normal range of motion.   Skin:     General: Skin is warm and dry.      Coloration: Skin is not pale.      Findings: No erythema or rash.   Neurological:      Mental Status: She is alert and oriented to person, place, and time.   Psychiatric:         Behavior: Behavior normal.         Thought Content: Thought content normal.         Judgment: Judgment normal.             IMPRESSION:    Request for sterilization                                    PLAN:    Procedure(s):  BILATERAL LAPAROSCOPIC SALPINGECTOMY FOR STERILIZATION    RISKS, ALTERNATIVES, COMPLICATIONS OF THE PROCEDURE INCLUDING BUT NOT LIMITED TO:    INTRAOPERATIVE RISKS: INJURY TO INTERNAL AND ADJACENT ORGANS AND STRUCTURES (BOWEL, BLADDER, URETER,BLOOD VESSELS) OR HEMORRHAGE REQUIRING FURTHER SURGERY (LAPAROTOMY),  POSSIBLE NON-DIAGNOSTIC FINDINGS, DISCOVERY OF POSSIBLE MALIGNANCY, INFECTION, AND DEATH;   POSTOP COMPLICATIONS: BLEEDING, INFECTION (REQUIRING POSSIBLE REOPERATION), FAILURE OF GOAL OF SURGERY AND RECURRENCE OF ORIGINAL  SYMPTOMS, PNEUMONIA, PULMONARY EMBOLISM, AND DEATH;  WERE EXPLAINED TO THE PT WHO VERBALIZED HER UNDERSTANDING.             I discussed the patients findings and my recommendations with patient.     Benjy Houston MD  08/19/21  07:20 EDT

## 2021-08-19 NOTE — OP NOTE
OPERATIVE REPORT    PROCEDURE:  LAPAROSCOPIC BILATERAL SALPINGECTOMY FOR STERILIZATION    PREOP DX:  Pt desires permanent sterilization    POSTOP DX:  same    SURGEON:  Benjy Houston MD    ASSISTANT:  Oumar Licea  was responsible for performing the following activities: Retraction, Suction, Suturing, Closing and Placing Dressing and their skilled assistance was necessary for the success of this case.    ANESTHESIA:  GETA    FINDINGS:  Normal pelvis and abdomen    COMPLICATIONS:  none    EBL:  1cc    IVFS:  500cc    URINE OUTPUT:  60cc    SPECIMENS: Left & Right fallopian tubes.    ANTIBIOTICS:  None        DESCRIPTION OF THE PROCEDURE:    After GETA had been induced and time out done, pt was placed in the dorso-lithotomy position and prepped and draped.  No antibiotics were given.    A sponge stick was placed in the vagina.      An infraumbilical 5mm port was placed without incident and the abdomen was insufflated with CO2.  Accessory ports were placed:  5mm in the LLQ, 8mm in the RLQ.      Pelvic and abdominal cavities were seen in their entirety.  There were no abnormalities.      Each fallopian tube was identified, traced out to the fimbriated end, and removed with the enseal intact.  The stumps were coagulated in 3 contiguous areas ensuring complete occlusion. Both occlusions appeared to be adequate for permanent sterilization.     All instruments were removed and the incisions were reapproximated with 4-0 monocryl in a subcuticular continuous fashion.      Pt tolerate procedure well and went to the RR in satis condition.    All sponge, instrument and needle counts were correct x 3 according to the OR personnel.    Benjy Houston MD  08:30 EDT  @today@

## 2021-08-19 NOTE — ANESTHESIA PREPROCEDURE EVALUATION
Anesthesia Evaluation     Patient summary reviewed and Nursing notes reviewed   no history of anesthetic complications:  NPO Solid Status: > 8 hours  NPO Liquid Status: > 8 hours           Airway   Mallampati: III  TM distance: >3 FB  Neck ROM: full  No difficulty expected  Dental - normal exam     Pulmonary - normal exam    breath sounds clear to auscultation  (+) pneumonia resolved , asthma,    ROS comment: 2019 - pneumonia - treated with abx and prednisone - Completely resolved.    Asthma as a child; no longer needs/uses an inhaler.  Cardiovascular - negative cardio ROS and normal exam  Exercise tolerance: good (4-7 METS)    Rhythm: regular  Rate: normal        Neuro/Psych- negative ROS  GI/Hepatic/Renal/Endo    (+)  GERD well controlled,      Musculoskeletal (-) negative ROS    Abdominal  - normal exam   Substance History - negative use     OB/GYN negative ob/gyn ROS   (-)  Pregnant        Other - negative ROS                       Anesthesia Plan    ASA 2     general     intravenous induction     Anesthetic plan, all risks, benefits, and alternatives have been provided, discussed and informed consent has been obtained with: patient and spouse/significant other.  Use of blood products discussed with patient  Consented to blood products.

## 2021-08-19 NOTE — ANESTHESIA PROCEDURE NOTES
Airway  Urgency: elective    Date/Time: 8/19/2021 7:58 AM  Airway not difficult    General Information and Staff    Patient location during procedure: OR  CRNA: Jailyn Alvarenga CRNA    Indications and Patient Condition  Indications for airway management: airway protection    Preoxygenated: yes  Mask difficulty assessment: 1 - vent by mask    Final Airway Details  Final airway type: endotracheal airway      Successful airway: ETT  Cuffed: yes   Successful intubation technique: direct laryngoscopy  Endotracheal tube insertion site: oral  Blade: Carolyn  Blade size: 3  ETT size (mm): 7.0  Cormack-Lehane Classification: grade I - full view of glottis  Placement verified by: chest auscultation and capnometry   Measured from: lips  ETT/EBT  to lips (cm): 21  Number of attempts at approach: 1  Assessment: lips, teeth, and gum same as pre-op and atraumatic intubation

## 2021-08-19 NOTE — ANESTHESIA POSTPROCEDURE EVALUATION
Patient: Edith Moses    Procedure Summary     Date: 08/19/21 Room / Location:  [facility] 2 /  [facility]    Anesthesia Start: 0749 Anesthesia Stop: 0843    Procedure: BILATERAL LAPAROSCOPIC SALPINGECTOMY FOR STERILIZATION (Bilateral Abdomen) Diagnosis:       Request for sterilization      (Request for sterilization [Z30.2])    Surgeons: Benjy Houston MD Provider: Jailyn Alvarenga CRNA    Anesthesia Type: general ASA Status: 2          Anesthesia Type: general    Vitals  Vitals Value Taken Time   /66 08/19/21 0930   Temp 98.4 °F (36.9 °C) 08/19/21 0847   Pulse 75 08/19/21 0930   Resp 16 08/19/21 0930   SpO2 94 % 08/19/21 0930   Vitals shown include unvalidated device data.        Post Anesthesia Care and Evaluation    Patient location during evaluation: bedside  Patient participation: complete - patient participated  Level of consciousness: awake and alert  Pain score: 0  Pain management: adequate  Airway patency: patent  Anesthetic complications: No anesthetic complications  PONV Status: none  Cardiovascular status: acceptable  Respiratory status: acceptable  Hydration status: acceptable

## 2021-08-20 LAB
CYTO UR: NORMAL
LAB AP CASE REPORT: NORMAL
PATH REPORT.FINAL DX SPEC: NORMAL
PATH REPORT.GROSS SPEC: NORMAL

## 2021-09-29 ENCOUNTER — OFFICE VISIT (OUTPATIENT)
Dept: OBSTETRICS AND GYNECOLOGY | Facility: CLINIC | Age: 31
End: 2021-09-29

## 2021-09-29 VITALS
BODY MASS INDEX: 27.48 KG/M2 | DIASTOLIC BLOOD PRESSURE: 60 MMHG | SYSTOLIC BLOOD PRESSURE: 110 MMHG | HEIGHT: 60 IN | WEIGHT: 140 LBS

## 2021-09-29 DIAGNOSIS — Z90.79 STATUS POST BILATERAL SALPINGECTOMY: ICD-10-CM

## 2021-09-29 DIAGNOSIS — Z09 POSTOP CHECK: ICD-10-CM

## 2021-09-29 DIAGNOSIS — Z09 POSTOPERATIVE FOLLOW-UP: Primary | ICD-10-CM

## 2021-09-29 LAB
BILIRUB BLD-MCNC: NEGATIVE MG/DL
CLARITY, POC: CLEAR
COLOR UR: YELLOW
GLUCOSE UR STRIP-MCNC: NEGATIVE MG/DL
KETONES UR QL: NEGATIVE
LEUKOCYTE EST, POC: NEGATIVE
NITRITE UR-MCNC: NEGATIVE MG/ML
PH UR: 5 [PH] (ref 5–8)
PROT UR STRIP-MCNC: NEGATIVE MG/DL
RBC # UR STRIP: NEGATIVE /UL
SP GR UR: 1 (ref 1–1.03)
UROBILINOGEN UR QL: NORMAL

## 2021-09-29 PROCEDURE — 81002 URINALYSIS NONAUTO W/O SCOPE: CPT | Performed by: OBSTETRICS & GYNECOLOGY

## 2021-09-29 PROCEDURE — 99212 OFFICE O/P EST SF 10 MIN: CPT | Performed by: OBSTETRICS & GYNECOLOGY

## 2024-03-07 ENCOUNTER — OFFICE VISIT (OUTPATIENT)
Dept: OBSTETRICS AND GYNECOLOGY | Facility: CLINIC | Age: 34
End: 2024-03-07
Payer: COMMERCIAL

## 2024-03-07 VITALS
HEIGHT: 60 IN | SYSTOLIC BLOOD PRESSURE: 122 MMHG | DIASTOLIC BLOOD PRESSURE: 70 MMHG | WEIGHT: 145 LBS | BODY MASS INDEX: 28.47 KG/M2

## 2024-03-07 DIAGNOSIS — R87.619 ABNORMAL CERVICAL PAPANICOLAOU SMEAR, UNSPECIFIED ABNORMAL PAP FINDING: ICD-10-CM

## 2024-03-07 DIAGNOSIS — N89.8 VAGINAL DISCHARGE: Primary | ICD-10-CM

## 2024-03-07 DIAGNOSIS — Z13.89 SCREENING FOR GENITOURINARY CONDITION: ICD-10-CM

## 2024-03-07 DIAGNOSIS — R31.9 HEMATURIA, UNSPECIFIED TYPE: ICD-10-CM

## 2024-03-07 LAB
B-HCG UR QL: NEGATIVE
BILIRUB BLD-MCNC: NEGATIVE MG/DL
CLARITY, POC: CLEAR
COLOR UR: YELLOW
EXPIRATION DATE: NORMAL
GLUCOSE UR STRIP-MCNC: NEGATIVE MG/DL
INTERNAL NEGATIVE CONTROL: NORMAL
INTERNAL POSITIVE CONTROL: NORMAL
KETONES UR QL: ABNORMAL
LEUKOCYTE EST, POC: ABNORMAL
Lab: NORMAL
NITRITE UR-MCNC: NEGATIVE MG/ML
PH UR: 5 [PH] (ref 5–8)
PROT UR STRIP-MCNC: NEGATIVE MG/DL
RBC # UR STRIP: ABNORMAL /UL
SP GR UR: 1 (ref 1–1.03)
UROBILINOGEN UR QL: NORMAL

## 2024-03-07 RX ORDER — AMOXICILLIN AND CLAVULANATE POTASSIUM 500; 125 MG/1; MG/1
1 TABLET, FILM COATED ORAL 2 TIMES DAILY
Qty: 14 TABLET | Refills: 0 | Status: SHIPPED | OUTPATIENT
Start: 2024-03-07 | End: 2024-03-14

## 2024-03-07 NOTE — PROGRESS NOTES
34 yo  here today for Colpo. ASCUS HPV 16+ at HD. Denies Hx of abnormal pap screen, STD. Denies HPV vaccine. With one partner ( 3 lifetime), Does not smoke     Tubal for contraception     Does feel like she has abnormal vaginal discharge and dysuria.      PMH:   Past Medical History:   Diagnosis Date    Asthma     AS A CHILD, NO PROBLEMS NOW    GERD (gastroesophageal reflux disease)     Pneumonia 2019    outpt tx with azithromycin and prednisone     Request for sterilization     SCHEDULED FOR LAP TUBAL                PSH:     Past Surgical History:   Procedure Laterality Date    OVARIAN CYST REMOVAL      SALPINGECTOMY Bilateral 2021    Procedure: BILATERAL LAPAROSCOPIC SALPINGECTOMY FOR STERILIZATION;  Surgeon: Benjy Houston MD;  Location: [facility];  Service: Obstetrics/Gynecology;  Laterality: Bilateral;             Social hx:   Social History     Socioeconomic History    Marital status:    Tobacco Use    Smoking status: Never     Passive exposure: Never    Smokeless tobacco: Never   Vaping Use    Vaping status: Never Used   Substance and Sexual Activity    Alcohol use: Yes     Comment: rare    Drug use: No    Sexual activity: Defer     Partners: Male     Birth control/protection: OCP        [  X ] no h/o abuse/DV:         [ X  ] No ETOH, tobacco, drugs  [   ] Tobacco use   [   ] Alcohol use  [   ] Drug use   Marital status:           [  X ] FOB involved     Significant family hx:   Denies                 [ X  ] No birth defects, stillbirth           Allergies:       Allergies   Allergen Reactions    Nitrofurantoin Rash                  Meds:   Current Outpatient Medications:     amoxicillin-clavulanate (AUGMENTIN) 500-125 MG per tablet, Take 1 tablet by mouth 2 (Two) Times a Day for 7 days., Disp: 14 tablet, Rfl: 0     Review of Systems   See HPI     Vitals:    24 1345   BP: 122/70        OBGyn Exam     Vitals: VSS; AF    General Appearance:  Awake. Alert. Well  developed. Well nourished. In no acute distress.    Visual Inspection: ° Abdomen was normal on visual inspection.  Palpation: ° Abdomen was soft. ° Abdominal non-tender.      Neurological:  ° Oriented to time, place, and person.  Skin:  ° General appearance was normal. No bruising or ecchymosis.        Colposcopy Procedure Note    Procedures          Indications: Most recent Pap smear showed: low-grade squamous intraepithelial neoplasia (LGSIL - encompassing HPV,mild dysplasia,RENAE I)    Prior cervical/vaginal disease: normal exam without visible pathology  Prior cervical treatment: no treatment.  Contraception: tubal ligation    Procedure Details   The risks and benefits of the procedure and Verbal informed consent obtained.  Pregnancy test: negative    Speculum placed in vagina and excellent visualization of cervix achieved, cervix swabbed x 3 with acetic acid solution and Lugol's solution     Findings:  Cervix: no visible lesions, no mosaicism, no punctation, and no abnormal vasculature; cervix swabbed with Lugol's solution, endocervical speculum placed, SCJ visualized 360 degrees without lesions, and endocervical curettage performed.  Vaginal inspection: vaginal colposcopy not performed.  Vulvar colposcopy: vulvar colposcopy not performed.  Colpo adequacy: was adquate    Specimens: ECC    Colpo impression: Normal appearing CVX     Complications: pain.   Patient tolerated procedure well.     Smoking Status: No      Plan:  Specimens labelled and sent to Pathology.  Will base further treatment on Pathology findings.  Treatment options discussed with patient.    Mani Peters DO   3/7/2024  15:17 EST           Diagnoses and all orders for this visit:    1. Vaginal discharge (Primary)  -     NuSwab VG+ - Swab, Vagina  -     Genital Mycoplasmas KANU, Swab - Swab, Vagina    2. Screening for genitourinary condition  -     POC Pregnancy, Urine  -     POC Urinalysis Dipstick    3. Hematuria, unspecified type  -      Urine Culture - Urine, Urine, Random Void    4. Abnormal cervical Papanicolaou smear, unspecified abnormal pap finding  -     Reference Histopathology    Other orders  -     amoxicillin-clavulanate (AUGMENTIN) 500-125 MG per tablet; Take 1 tablet by mouth 2 (Two) Times a Day for 7 days.  Dispense: 14 tablet; Refill: 0     F/U per ASCCP guidelines  Vaginal swabs pending  UC pending     Mani Peters DO  03/07/2024    15:16 EST

## 2024-03-11 LAB
BACTERIA UR CULT: NORMAL
BACTERIA UR CULT: NORMAL

## 2024-03-12 LAB
DX ICD CODE: NORMAL
PATH REPORT.FINAL DX SPEC: NORMAL
PATH REPORT.GROSS SPEC: NORMAL
PATH REPORT.SITE OF ORIGIN SPEC: NORMAL
PATHOLOGIST NAME: NORMAL
PAYMENT PROCEDURE: NORMAL

## 2024-03-13 LAB
A VAGINAE DNA VAG QL NAA+PROBE: ABNORMAL SCORE
BVAB2 DNA VAG QL NAA+PROBE: ABNORMAL SCORE
C ALBICANS DNA VAG QL NAA+PROBE: NEGATIVE
C GLABRATA DNA VAG QL NAA+PROBE: NEGATIVE
C TRACH DNA VAG QL NAA+PROBE: NEGATIVE
M GENITALIUM DNA SPEC QL NAA+PROBE: NEGATIVE
M HOMINIS DNA SPEC QL NAA+PROBE: NEGATIVE
MEGA1 DNA VAG QL NAA+PROBE: ABNORMAL SCORE
N GONORRHOEA DNA VAG QL NAA+PROBE: NEGATIVE
T VAGINALIS DNA VAG QL NAA+PROBE: NEGATIVE
UREAPLASMA DNA SPEC QL NAA+PROBE: NEGATIVE

## 2024-03-13 RX ORDER — METRONIDAZOLE 500 MG/1
500 TABLET ORAL 2 TIMES DAILY
Qty: 14 TABLET | Refills: 0 | Status: SHIPPED | OUTPATIENT
Start: 2024-03-13 | End: 2024-03-20

## 2024-09-11 ENCOUNTER — OFFICE VISIT (OUTPATIENT)
Dept: CARDIOLOGY | Facility: CLINIC | Age: 34
End: 2024-09-11
Payer: COMMERCIAL

## 2024-09-11 VITALS
BODY MASS INDEX: 28.71 KG/M2 | DIASTOLIC BLOOD PRESSURE: 82 MMHG | SYSTOLIC BLOOD PRESSURE: 134 MMHG | WEIGHT: 147 LBS | OXYGEN SATURATION: 98 % | HEART RATE: 80 BPM

## 2024-09-11 DIAGNOSIS — Z02.4 ENCOUNTER FOR CDL (COMMERCIAL DRIVING LICENSE) EXAM: ICD-10-CM

## 2024-09-11 DIAGNOSIS — R01.1 MURMUR: Primary | ICD-10-CM

## 2024-09-11 NOTE — PROGRESS NOTES
Subjective:     Encounter Date:09/12/24      Patient ID: Edith Moses is a 34 y.o. female.    Chief Complaint:  History of Present Illness      I had the pleasure of seeing this patient in the office today for initial evaluation and consultation.  They come in today to be seen for evaluation of a murmur.    Patient was recently being seen for a L physical for [employer].  She has they heard a murmur so they referred her to cardiology.  I do not have any documentation that is sent to me and she did not have anything that she was sent to bring.    No complaints of chest pain or chest discomfort.  Sometimes a little bit of shortness of breath.  She notes that when she is really active.  She also has had a lot of stress lately.    No history of any cardiac pathology.    The following portions of the patient's history were reviewed and updated as appropriate: allergies, current medications, past family history, past medical history, past social history, past surgical history and problem list.      ECG 12 Lead    Date/Time: 9/11/2024 4:48 PM  Performed by: Rahat Moralez III, MD    Authorized by: Rahat Moralez III, MD  Comparison: compared with previous ECG   Similar to previous ECG  Rhythm: sinus rhythm  Rate: normal  Conduction: conduction normal  ST Segments: ST segments normal  T Waves: T waves normal  QRS axis: normal  Other: no other findings    Clinical impression: normal ECG             Objective:     Vitals:    09/11/24 1509   BP: 134/82   BP Location: Right arm   Patient Position: Sitting   Cuff Size: Adult   Pulse: 80   SpO2: 98%   Weight: 66.7 kg (147 lb)     Body mass index is 28.71 kg/m².      Vitals reviewed.   Constitutional:       General: Not in acute distress.     Appearance: Well-developed. Not diaphoretic.   Eyes:      General:         Right eye: No discharge.         Left eye: No discharge.      Conjunctiva/sclera: Conjunctivae normal.   HENT:      Head: Normocephalic and atraumatic.      Nose: Nose  "normal.   Neck:      Thyroid: No thyromegaly.      Trachea: No tracheal deviation.   Pulmonary:      Effort: Pulmonary effort is normal. No respiratory distress.      Breath sounds: Normal breath sounds. No stridor.   Chest:      Chest wall: Not tender to palpatation.   Cardiovascular:      Normal rate. Regular rhythm.      Murmurs: There is a midsystolic murmur at the URSB, radiating to the neck.      . No S3 gallop. No click. No rub.   Pulses:     Intact distal pulses.   Edema:     Peripheral edema absent.   Abdominal:      General: Bowel sounds are normal. There is no distension.      Palpations: Abdomen is soft. There is no abdominal mass.   Musculoskeletal: Normal range of motion.         General: No tenderness or deformity.      Cervical back: Normal range of motion and neck supple. Skin:     General: Skin is warm and dry.      Findings: No erythema or rash.   Neurological:      Mental Status: Alert.   Psychiatric:         Attention and Perception: Attention normal.         Data and records reviewed:     No results found for: \"GLUCOSE\", \"BUN\", \"CREATININE\", \"NA\", \"K\", \"CL\", \"CALCIUM\", \"PROTEINTOT\", \"ALBUMIN\", \"ALT\", \"AST\", \"ALKPHOS\", \"BILITOT\", \"GLOB\", \"AGRATIO\", \"BCR\", \"ANIONGAP\", \"EGFR\"  No results found for: \"CHOL\"  No results found for: \"TRIG\"  No results found for: \"HDL\"  No results found for: \"LDL\"  No results found for: \"VLDL\"  No results found for: \"LDLHDL\"    No radiology results for the last 90 days.          Assessment:          Diagnosis Plan   1. 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  Murmur-patient has a soft outflow murmur, was sent here for evaluation with an echocardiogram as part of her CDL assessment, we will arrange for the echo.  Thank you very much for allowing us to participate in the care of this pleasant patient.  Please don't hesitate to call if " I can be of assistance in any way.    No current outpatient medications on file.         No follow-ups on file.

## 2024-09-18 ENCOUNTER — HOSPITAL ENCOUNTER (OUTPATIENT)
Dept: CARDIOLOGY | Facility: HOSPITAL | Age: 34
Discharge: HOME | End: 2024-09-18
Admitting: INTERNAL MEDICINE
Payer: COMMERCIAL

## 2024-09-18 ENCOUNTER — TELEPHONE (OUTPATIENT)
Dept: CARDIOLOGY | Facility: CLINIC | Age: 34
End: 2024-09-18
Payer: COMMERCIAL

## 2024-09-18 VITALS
HEIGHT: 60 IN | BODY MASS INDEX: 28.86 KG/M2 | HEART RATE: 71 BPM | OXYGEN SATURATION: 99 % | WEIGHT: 147 LBS | SYSTOLIC BLOOD PRESSURE: 122 MMHG | DIASTOLIC BLOOD PRESSURE: 84 MMHG

## 2024-09-18 DIAGNOSIS — R01.1 MURMUR: ICD-10-CM

## 2024-09-18 LAB
AORTIC ARCH: 2.2 CM
AORTIC DIMENSIONLESS INDEX: 0.8 (DI)
ASCENDING AORTA: 2.5 CM
BH CV ECHO MEAS - ACS: 2 CM
BH CV ECHO MEAS - AO MAX PG: 6.4 MMHG
BH CV ECHO MEAS - AO MEAN PG: 4 MMHG
BH CV ECHO MEAS - AO ROOT DIAM: 2.9 CM
BH CV ECHO MEAS - AO V2 MAX: 126 CM/SEC
BH CV ECHO MEAS - AO V2 VTI: 26.5 CM
BH CV ECHO MEAS - AVA(I,D): 2.4 CM2
BH CV ECHO MEAS - EDV(CUBED): 68.9 ML
BH CV ECHO MEAS - EDV(MOD-SP2): 41 ML
BH CV ECHO MEAS - EDV(MOD-SP4): 48 ML
BH CV ECHO MEAS - EF(MOD-BP): 56.9 %
BH CV ECHO MEAS - EF(MOD-SP2): 58.5 %
BH CV ECHO MEAS - EF(MOD-SP4): 60.4 %
BH CV ECHO MEAS - ESV(CUBED): 12.3 ML
BH CV ECHO MEAS - ESV(MOD-SP2): 17 ML
BH CV ECHO MEAS - ESV(MOD-SP4): 19 ML
BH CV ECHO MEAS - FS: 43.7 %
BH CV ECHO MEAS - IVS/LVPW: 0.88 CM
BH CV ECHO MEAS - IVSD: 0.7 CM
BH CV ECHO MEAS - LA 3D VOL INDEX: 17
BH CV ECHO MEAS - LAT PEAK E' VEL: 15.2 CM/SEC
BH CV ECHO MEAS - LV DIASTOLIC VOL/BSA (35-75): 29.3 CM2
BH CV ECHO MEAS - LV MASS(C)D: 89.4 GRAMS
BH CV ECHO MEAS - LV MAX PG: 3.7 MMHG
BH CV ECHO MEAS - LV MEAN PG: 2 MMHG
BH CV ECHO MEAS - LV SYSTOLIC VOL/BSA (12-30): 11.6 CM2
BH CV ECHO MEAS - LV V1 MAX: 95.6 CM/SEC
BH CV ECHO MEAS - LV V1 VTI: 20.2 CM
BH CV ECHO MEAS - LVIDD: 4.1 CM
BH CV ECHO MEAS - LVIDS: 2.31 CM
BH CV ECHO MEAS - LVOT AREA: 3.1 CM2
BH CV ECHO MEAS - LVOT DIAM: 2 CM
BH CV ECHO MEAS - LVPWD: 0.8 CM
BH CV ECHO MEAS - MED PEAK E' VEL: 8.7 CM/SEC
BH CV ECHO MEAS - MV A DUR: 0.1 SEC
BH CV ECHO MEAS - MV A MAX VEL: 46.9 CM/SEC
BH CV ECHO MEAS - MV DEC SLOPE: 1008 CM/SEC2
BH CV ECHO MEAS - MV DEC TIME: 0.13 SEC
BH CV ECHO MEAS - MV E MAX VEL: 116 CM/SEC
BH CV ECHO MEAS - MV E/A: 2.47
BH CV ECHO MEAS - MV MAX PG: 5.3 MMHG
BH CV ECHO MEAS - MV MEAN PG: 1.91 MMHG
BH CV ECHO MEAS - MV P1/2T: 32.2 MSEC
BH CV ECHO MEAS - MV V2 VTI: 25.5 CM
BH CV ECHO MEAS - MVA(P1/2T): 6.8 CM2
BH CV ECHO MEAS - MVA(VTI): 2.5 CM2
BH CV ECHO MEAS - PA ACC TIME: 0.09 SEC
BH CV ECHO MEAS - PA V2 MAX: 101.8 CM/SEC
BH CV ECHO MEAS - PULM A REVS DUR: 0.11 SEC
BH CV ECHO MEAS - PULM A REVS VEL: 24 CM/SEC
BH CV ECHO MEAS - PULM DIAS VEL: 37.3 CM/SEC
BH CV ECHO MEAS - PULM S/D: 1.17
BH CV ECHO MEAS - PULM SYS VEL: 43.7 CM/SEC
BH CV ECHO MEAS - QP/QS: 1.09
BH CV ECHO MEAS - RAP SYSTOLE: 3 MMHG
BH CV ECHO MEAS - RV MAX PG: 3.3 MMHG
BH CV ECHO MEAS - RV V1 MAX: 90.4 CM/SEC
BH CV ECHO MEAS - RV V1 VTI: 22 CM
BH CV ECHO MEAS - RVOT DIAM: 2.01 CM
BH CV ECHO MEAS - RVSP: 29.8 MMHG
BH CV ECHO MEAS - SUP REN AO DIAM: 1.7 CM
BH CV ECHO MEAS - SV(LVOT): 63.6 ML
BH CV ECHO MEAS - SV(MOD-SP2): 24 ML
BH CV ECHO MEAS - SV(MOD-SP4): 29 ML
BH CV ECHO MEAS - SV(RVOT): 69.6 ML
BH CV ECHO MEAS - SVI(LVOT): 38.8 ML/M2
BH CV ECHO MEAS - SVI(MOD-SP2): 14.7 ML/M2
BH CV ECHO MEAS - SVI(MOD-SP4): 17.7 ML/M2
BH CV ECHO MEAS - TAPSE (>1.6): 1.71 CM
BH CV ECHO MEAS - TR MAX PG: 26.8 MMHG
BH CV ECHO MEAS - TR MAX VEL: 259 CM/SEC
BH CV ECHO MEASUREMENTS AVERAGE E/E' RATIO: 9.71
BH CV VAS BP LEFT ARM: NORMAL MMHG
BH CV XLRA - RV BASE: 2.4 CM
BH CV XLRA - RV LENGTH: 7 CM
BH CV XLRA - RV MID: 2.42 CM
BH CV XLRA - TDI S': 9.5 CM/SEC
LEFT ATRIUM VOLUME INDEX: 26.2 ML/M2
SINUS: 2.7 CM
STJ: 2.38 CM

## 2024-09-18 PROCEDURE — 93306 TTE W/DOPPLER COMPLETE: CPT

## (undated) DEVICE — DRSNG TELFA PAD NONADH STR 1S 3X8IN

## (undated) DEVICE — GOWN,PREVENTION PLUS,XXLARGE,STERILE: Brand: MEDLINE

## (undated) DEVICE — ADHS SKIN PREMIERPRO EXOFIN TOPICAL HI/VISC .5ML

## (undated) DEVICE — SUCTION CANISTER, 1000CC,SAFELINER: Brand: DEROYAL

## (undated) DEVICE — PATIENT RETURN ELECTRODE, SINGLE-USE, CONTACT QUALITY MONITORING, ADULT, WITH 9FT CORD, FOR PATIENTS WEIGING OVER 33LBS. (15KG): Brand: MEGADYNE

## (undated) DEVICE — ENSEAL TRIO TEMPERATURE CONTOLLED TISSUE SEALING TECHNOLOGY DISPOSABLE TISSUE SEALING DEVICE TAPTRONIC TRIGGER ACTIVATED POWER 3MM CURVED JAW: Brand: ENSEAL

## (undated) DEVICE — ENDOPATH XCEL BLADELESS TROCARS WITH STABILITY SLEEVES: Brand: ENDOPATH XCEL

## (undated) DEVICE — ENDOPATH XCEL UNIVERSAL TROCAR STABLILITY SLEEVES: Brand: ENDOPATH XCEL

## (undated) DEVICE — SUT MNCRYL 4/0 PS2 18 IN

## (undated) DEVICE — LAPAROSCOPIC SMOKE FILTRATION SYSTEM: Brand: PALL LAPAROSHIELD® PLUS LAPAROSCOPIC SMOKE FILTRATION SYSTEM

## (undated) DEVICE — LAG GYN LAPAROSCOPY: Brand: MEDLINE INDUSTRIES, INC.

## (undated) DEVICE — TBG INSUFL W FLTR STRL